# Patient Record
Sex: MALE | Race: WHITE | Employment: PART TIME | URBAN - METROPOLITAN AREA
[De-identification: names, ages, dates, MRNs, and addresses within clinical notes are randomized per-mention and may not be internally consistent; named-entity substitution may affect disease eponyms.]

---

## 2020-11-19 ENCOUNTER — HOSPITAL ENCOUNTER (OUTPATIENT)
Age: 55
Discharge: HOME | End: 2020-11-19
Payer: MEDICAID

## 2020-11-19 VITALS
TEMPERATURE: 98 F | WEIGHT: 200 LBS | SYSTOLIC BLOOD PRESSURE: 136 MMHG | HEART RATE: 88 BPM | RESPIRATION RATE: 18 BRPM | OXYGEN SATURATION: 98 % | DIASTOLIC BLOOD PRESSURE: 93 MMHG

## 2020-11-19 DIAGNOSIS — Z20.822 ENCOUNTER FOR SCREENING LABORATORY TESTING FOR COVID-19 VIRUS: Primary | ICD-10-CM

## 2020-11-19 PROCEDURE — 99201 OFFICE/OUTPT VISIT,NEW,LEVL I: CPT | Performed by: NURSE PRACTITIONER

## 2020-11-19 RX ORDER — CLOPIDOGREL BISULFATE 75 MG/1
75 TABLET ORAL
COMMUNITY
Start: 2018-09-14

## 2020-11-19 RX ORDER — ATORVASTATIN CALCIUM 40 MG/1
40 TABLET, FILM COATED ORAL
COMMUNITY
Start: 2018-09-14

## 2020-11-19 RX ORDER — ASPIRIN 81 MG/1
81 TABLET ORAL
COMMUNITY
Start: 2018-01-20

## 2020-11-19 NOTE — ED PROVIDER NOTES
Patient presents with:  Testing      HPI:     Lotus Palomo is a 54year old male who presents for a COVID test. He had a possible exposure. He denies any symptoms or concerns. [facility]   Nursing note reviewed and above.  Constitutional and Vital Signs Reviewed.     Physical Exam:     Findings:    BP (!) 136/93   Pulse 88   Temp 97.8 °F (36.6 °C) (Temporal)   Resp 18   Wt 90.7 kg   SpO2 98%   GENERAL: well developed, well nourished, well hydrated, no distress  SKIN:

## 2020-11-19 NOTE — ED INITIAL ASSESSMENT (HPI)
Pt c/o headache since this am.  No fever. No cold/cough. States positive covid exposure 11/13. Awake/alert. Breathing easy and even without distress. Speech clear. Skin warm, dry and pink.

## 2021-01-18 ENCOUNTER — HOSPITAL ENCOUNTER (OUTPATIENT)
Age: 56
Discharge: HOME | End: 2021-01-18
Payer: OTHER GOVERNMENT

## 2021-01-18 VITALS
OXYGEN SATURATION: 99 % | BODY MASS INDEX: 28 KG/M2 | HEIGHT: 71 IN | RESPIRATION RATE: 16 BRPM | DIASTOLIC BLOOD PRESSURE: 96 MMHG | SYSTOLIC BLOOD PRESSURE: 140 MMHG | TEMPERATURE: 97 F | WEIGHT: 200 LBS | HEART RATE: 89 BPM

## 2021-01-18 DIAGNOSIS — Z20.822 ENCOUNTER FOR LABORATORY TESTING FOR COVID-19 VIRUS: Primary | ICD-10-CM

## 2021-01-18 LAB — SARS-COV-2 RNA RESP QL NAA+PROBE: DETECTED

## 2021-01-18 PROCEDURE — 99212 OFFICE O/P EST SF 10 MIN: CPT | Performed by: EMERGENCY MEDICINE

## 2021-01-18 NOTE — ED PROVIDER NOTES
Patient Seen in: [facility]      History   Patient presents with:  Testing    Stated Complaint: covid test    HPI/Subjective: Palma Roa is a 54year old  male here for  Covid testing after exposure 6 days ago.  Medical history includes congestion. Mouth/Throat:      Mouth: Mucous membranes are moist.      Pharynx: No oropharyngeal exudate. Eyes:      Conjunctiva/sclera: Conjunctivae normal.      Pupils: Pupils are equal, round, and reactive to light.    Neck:      Musculoskeletal: recommend rest, hydration, and otc measures at home if sx develop. I have given the patient instructions regarding their diagnoses, expectations, follow up, and ER precautions.  I explained to the patient that emergent conditions may arise and to go to t

## (undated) NOTE — LETTER
[facility]  [address] [place] [id number]  [phone]     Patient: Angel Camara   YOB: 1965   Date of Visit: 1/18/2021     Dear Employer,        January 18, 2021    At [facility], we are taking special precauti Persons infected with SARS-CoV-2 who never develop COVID-19 symptoms may discontinue isolation and other precautions 10 days after the date of their first positive RT-PCR test for SARS-CoV-2 RNA.     Persons who are asymptomatic but have been exposed, CDC r